# Patient Record
Sex: FEMALE | Race: WHITE | NOT HISPANIC OR LATINO | URBAN - METROPOLITAN AREA
[De-identification: names, ages, dates, MRNs, and addresses within clinical notes are randomized per-mention and may not be internally consistent; named-entity substitution may affect disease eponyms.]

---

## 2017-07-14 ENCOUNTER — APPOINTMENT (RX ONLY)
Dept: URBAN - METROPOLITAN AREA CLINIC 41 | Facility: CLINIC | Age: 82
Setting detail: DERMATOLOGY
End: 2017-07-14

## 2017-07-14 DIAGNOSIS — L0390 CELLULITIS AND ABSCESS OF UNSPECIFIED SITES: ICD-10-CM

## 2017-07-14 DIAGNOSIS — L89 PRESSURE ULCER: ICD-10-CM

## 2017-07-14 DIAGNOSIS — L0391 CELLULITIS AND ABSCESS OF UNSPECIFIED SITES: ICD-10-CM

## 2017-07-14 PROBLEM — L03.116 CELLULITIS OF LEFT LOWER LIMB: Status: ACTIVE | Noted: 2017-07-14

## 2017-07-14 PROBLEM — L89.319 PRESSURE ULCER OF RIGHT BUTTOCK, UNSPECIFIED STAGE: Status: ACTIVE | Noted: 2017-07-14

## 2017-07-14 PROCEDURE — 99212 OFFICE O/P EST SF 10 MIN: CPT | Mod: 25

## 2017-07-14 PROCEDURE — 29580 STRAPPING UNNA BOOT: CPT | Mod: LT

## 2017-07-14 PROCEDURE — ? COUNSELING

## 2017-07-14 PROCEDURE — ? UNNA BOOT APPLICATION

## 2017-07-14 PROCEDURE — ? TREATMENT REGIMEN

## 2017-07-14 ASSESSMENT — LOCATION SIMPLE DESCRIPTION DERM
LOCATION SIMPLE: LEFT PRETIBIAL REGION
LOCATION SIMPLE: RIGHT BUTTOCK

## 2017-07-14 ASSESSMENT — LOCATION DETAILED DESCRIPTION DERM
LOCATION DETAILED: LEFT DISTAL PRETIBIAL REGION
LOCATION DETAILED: RIGHT MEDIAL BUTTOCK

## 2017-07-14 ASSESSMENT — LOCATION ZONE DERM
LOCATION ZONE: LEG
LOCATION ZONE: TRUNK

## 2017-07-14 NOTE — PROCEDURE: UNNA BOOT APPLICATION
Indication: cellulitis
Medication Occluded Under Unnaboot: Bactroban
Additional Instructions: Do not get it wet for one week
Location Applied: the left leg
Detail Level: Detailed

## 2017-07-21 ENCOUNTER — APPOINTMENT (RX ONLY)
Dept: URBAN - METROPOLITAN AREA CLINIC 41 | Facility: CLINIC | Age: 82
Setting detail: DERMATOLOGY
End: 2017-07-21

## 2017-07-21 DIAGNOSIS — L0390 CELLULITIS AND ABSCESS OF UNSPECIFIED SITES: ICD-10-CM

## 2017-07-21 DIAGNOSIS — L0391 CELLULITIS AND ABSCESS OF UNSPECIFIED SITES: ICD-10-CM

## 2017-07-21 PROBLEM — L03.116 CELLULITIS OF LEFT LOWER LIMB: Status: ACTIVE | Noted: 2017-07-21

## 2017-07-21 PROCEDURE — ? UNNA BOOT APPLICATION

## 2017-07-21 PROCEDURE — 29580 STRAPPING UNNA BOOT: CPT | Mod: LT

## 2017-07-21 ASSESSMENT — LOCATION DETAILED DESCRIPTION DERM: LOCATION DETAILED: LEFT DISTAL PRETIBIAL REGION

## 2017-07-21 ASSESSMENT — LOCATION SIMPLE DESCRIPTION DERM: LOCATION SIMPLE: LEFT PRETIBIAL REGION

## 2017-07-21 ASSESSMENT — LOCATION ZONE DERM: LOCATION ZONE: LEG

## 2017-07-21 NOTE — PROCEDURE: UNNA BOOT APPLICATION
Detail Level: Simple
Location Applied: the left leg
Medication Occluded Under Unnaboot: Bactroban
Indication: lymphedema

## 2017-07-28 ENCOUNTER — APPOINTMENT (RX ONLY)
Dept: URBAN - METROPOLITAN AREA CLINIC 41 | Facility: CLINIC | Age: 82
Setting detail: DERMATOLOGY
End: 2017-07-28

## 2017-07-28 DIAGNOSIS — L0391 CELLULITIS AND ABSCESS OF UNSPECIFIED SITES: ICD-10-CM

## 2017-07-28 DIAGNOSIS — L0390 CELLULITIS AND ABSCESS OF UNSPECIFIED SITES: ICD-10-CM

## 2017-07-28 PROBLEM — L03.116 CELLULITIS OF LEFT LOWER LIMB: Status: ACTIVE | Noted: 2017-07-28

## 2017-07-28 PROCEDURE — 29580 STRAPPING UNNA BOOT: CPT | Mod: LT

## 2017-07-28 PROCEDURE — ? UNNA BOOT APPLICATION

## 2017-07-28 ASSESSMENT — LOCATION DETAILED DESCRIPTION DERM: LOCATION DETAILED: LEFT DISTAL PRETIBIAL REGION

## 2017-07-28 ASSESSMENT — LOCATION ZONE DERM: LOCATION ZONE: LEG

## 2017-07-28 ASSESSMENT — LOCATION SIMPLE DESCRIPTION DERM: LOCATION SIMPLE: LEFT PRETIBIAL REGION

## 2017-07-28 NOTE — PROCEDURE: UNNA BOOT APPLICATION
Indication: lymphedema
Medication Occluded Under Unnaboot: Bactroban
Detail Level: Simple
Location Applied: the left leg

## 2017-08-04 ENCOUNTER — APPOINTMENT (RX ONLY)
Dept: URBAN - METROPOLITAN AREA CLINIC 41 | Facility: CLINIC | Age: 82
Setting detail: DERMATOLOGY
End: 2017-08-04

## 2017-08-04 DIAGNOSIS — L0390 CELLULITIS AND ABSCESS OF UNSPECIFIED SITES: ICD-10-CM | Status: IMPROVED

## 2017-08-04 DIAGNOSIS — L0391 CELLULITIS AND ABSCESS OF UNSPECIFIED SITES: ICD-10-CM | Status: IMPROVED

## 2017-08-04 PROBLEM — L03.116 CELLULITIS OF LEFT LOWER LIMB: Status: ACTIVE | Noted: 2017-08-04

## 2017-08-04 PROCEDURE — ? COUNSELING

## 2017-08-04 PROCEDURE — 29580 STRAPPING UNNA BOOT: CPT | Mod: LT

## 2017-08-04 PROCEDURE — ? UNNA BOOT APPLICATION

## 2017-08-04 ASSESSMENT — LOCATION ZONE DERM: LOCATION ZONE: LEG

## 2017-08-04 ASSESSMENT — LOCATION DETAILED DESCRIPTION DERM: LOCATION DETAILED: LEFT DISTAL PRETIBIAL REGION

## 2017-08-04 ASSESSMENT — LOCATION SIMPLE DESCRIPTION DERM: LOCATION SIMPLE: LEFT PRETIBIAL REGION

## 2017-08-11 ENCOUNTER — APPOINTMENT (RX ONLY)
Dept: URBAN - METROPOLITAN AREA CLINIC 41 | Facility: CLINIC | Age: 82
Setting detail: DERMATOLOGY
End: 2017-08-11

## 2017-08-11 DIAGNOSIS — L81.4 OTHER MELANIN HYPERPIGMENTATION: ICD-10-CM

## 2017-08-11 DIAGNOSIS — L0390 CELLULITIS AND ABSCESS OF UNSPECIFIED SITES: ICD-10-CM | Status: IMPROVED

## 2017-08-11 DIAGNOSIS — D18.0 HEMANGIOMA: ICD-10-CM

## 2017-08-11 DIAGNOSIS — L0391 CELLULITIS AND ABSCESS OF UNSPECIFIED SITES: ICD-10-CM | Status: IMPROVED

## 2017-08-11 DIAGNOSIS — L82.1 OTHER SEBORRHEIC KERATOSIS: ICD-10-CM

## 2017-08-11 DIAGNOSIS — D485 NEOPLASM OF UNCERTAIN BEHAVIOR OF SKIN: ICD-10-CM

## 2017-08-11 PROBLEM — D48.5 NEOPLASM OF UNCERTAIN BEHAVIOR OF SKIN: Status: ACTIVE | Noted: 2017-08-11

## 2017-08-11 PROBLEM — L03.116 CELLULITIS OF LEFT LOWER LIMB: Status: ACTIVE | Noted: 2017-08-11

## 2017-08-11 PROBLEM — D18.01 HEMANGIOMA OF SKIN AND SUBCUTANEOUS TISSUE: Status: ACTIVE | Noted: 2017-08-11

## 2017-08-11 PROCEDURE — 29580 STRAPPING UNNA BOOT: CPT | Mod: LT

## 2017-08-11 PROCEDURE — 11100: CPT

## 2017-08-11 PROCEDURE — 99213 OFFICE O/P EST LOW 20 MIN: CPT | Mod: 25

## 2017-08-11 PROCEDURE — ? COUNSELING

## 2017-08-11 PROCEDURE — ? BIOPSY BY SHAVE METHOD

## 2017-08-11 PROCEDURE — ? UNNA BOOT APPLICATION

## 2017-08-11 ASSESSMENT — LOCATION SIMPLE DESCRIPTION DERM
LOCATION SIMPLE: UPPER BACK
LOCATION SIMPLE: RIGHT PRETIBIAL REGION
LOCATION SIMPLE: LEFT PRETIBIAL REGION
LOCATION SIMPLE: RIGHT UPPER ARM
LOCATION SIMPLE: LEFT UPPER ARM

## 2017-08-11 ASSESSMENT — LOCATION ZONE DERM
LOCATION ZONE: ARM
LOCATION ZONE: TRUNK
LOCATION ZONE: LEG

## 2017-08-11 ASSESSMENT — LOCATION DETAILED DESCRIPTION DERM
LOCATION DETAILED: LEFT ANTERIOR DISTAL UPPER ARM
LOCATION DETAILED: INFERIOR THORACIC SPINE
LOCATION DETAILED: RIGHT DISTAL PRETIBIAL REGION
LOCATION DETAILED: RIGHT ANTERIOR PROXIMAL UPPER ARM
LOCATION DETAILED: LEFT DISTAL PRETIBIAL REGION

## 2017-08-11 NOTE — PROCEDURE: UNNA BOOT APPLICATION
Detail Level: Zone
Location Applied: the left leg
Indication: post-operative immobilization
Medication Occluded Under Unnaboot: Antibiotic ointment
Additional Instructions: Do not get it wet for one week

## 2017-08-11 NOTE — PROCEDURE: BIOPSY BY SHAVE METHOD
Additional Anesthesia Volume In Cc (Will Not Render If 0): 0
X Size Of Lesion In Cm: 0.4
Biopsy Method: Double edge Personna blades
Biopsy Type: H and E
Consent: Verbal consent was obtained and risks were reviewed including but not limited to scarring, infection, bleeding, scabbing, incomplete removal, nerve damage and allergy to anesthesia.
Wound Care: Mupirocin
Anesthesia Volume In Cc: 0.5
Notification Instructions: Patient will be notified of biopsy results. However, patient instructed to call the office if not contacted within 2 weeks.
Lab: [id number]
Detail Level: Detailed
Lab Facility: [id number]
Silver Nitrate Text: The wound bed was treated with silver nitrate after the biopsy was performed.
Electrodesiccation Text: The wound bed was treated with electrodesiccation after the biopsy was performed.
Electrodesiccation And Curettage Text: The wound bed was treated with electrodesiccation and curettage after the biopsy was performed.
Render Post-Care Instructions In Note?: no
Cryotherapy Text: The wound bed was treated with cryotherapy after the biopsy was performed.
Anesthesia Type: 1% lidocaine without epinephrine
Dressing: bandage
Billing Type: Third-Party Bill
Type Of Destruction Used: Electrodesiccation and Curettage
Hemostasis: Electrocautery
Post-Care Instructions: I reviewed with the patient in detail post-care instructions. Patient is to keep the biopsy site dry overnight, and then apply bacitracin twice daily until healed. Patient may apply hydrogen peroxide soaks to remove any crusting.

## 2017-08-18 ENCOUNTER — APPOINTMENT (RX ONLY)
Dept: URBAN - METROPOLITAN AREA CLINIC 41 | Facility: CLINIC | Age: 82
Setting detail: DERMATOLOGY
End: 2017-08-18

## 2017-08-18 DIAGNOSIS — L97 NON-PRESSURE CHRONIC ULCER OF LOWER LIMB, NOT ELSEWHERE CLASSIFIED: ICD-10-CM

## 2017-08-18 PROBLEM — L97.829 NON-PRESSURE CHRONIC ULCER OF OTHER PART OF LEFT LOWER LEG WITH UNSPECIFIED SEVERITY: Status: ACTIVE | Noted: 2017-08-18

## 2017-08-18 PROCEDURE — ? TREATMENT REGIMEN

## 2017-08-18 PROCEDURE — ? OTHER

## 2017-08-18 PROCEDURE — ? COUNSELING

## 2017-08-18 PROCEDURE — 99212 OFFICE O/P EST SF 10 MIN: CPT

## 2017-08-18 ASSESSMENT — LOCATION DETAILED DESCRIPTION DERM: LOCATION DETAILED: LEFT MEDIAL DISTAL PRETIBIAL REGION

## 2017-08-18 ASSESSMENT — LOCATION SIMPLE DESCRIPTION DERM: LOCATION SIMPLE: LEFT PRETIBIAL REGION

## 2017-08-18 ASSESSMENT — LOCATION ZONE DERM: LOCATION ZONE: LEG

## 2017-08-30 NOTE — PROCEDURE: OTHER
Detail Level: Zone
Note Text (......Xxx Chief Complaint.): This diagnosis correlates with the
Other (Free Text): Had Jazmin step in to look at the leg
Unknown if ever smoked

## 2017-09-01 ENCOUNTER — APPOINTMENT (RX ONLY)
Dept: URBAN - METROPOLITAN AREA CLINIC 41 | Facility: CLINIC | Age: 82
Setting detail: DERMATOLOGY
End: 2017-09-01

## 2017-09-01 DIAGNOSIS — L97 NON-PRESSURE CHRONIC ULCER OF LOWER LIMB, NOT ELSEWHERE CLASSIFIED: ICD-10-CM

## 2017-09-01 PROBLEM — L97.829 NON-PRESSURE CHRONIC ULCER OF OTHER PART OF LEFT LOWER LEG WITH UNSPECIFIED SEVERITY: Status: ACTIVE | Noted: 2017-09-01

## 2017-09-01 PROCEDURE — 29580 STRAPPING UNNA BOOT: CPT | Mod: LT

## 2017-09-01 PROCEDURE — ? UNNA BOOT APPLICATION

## 2017-09-01 PROCEDURE — ? COUNSELING

## 2017-09-01 ASSESSMENT — LOCATION SIMPLE DESCRIPTION DERM: LOCATION SIMPLE: LEFT PRETIBIAL REGION

## 2017-09-01 ASSESSMENT — LOCATION ZONE DERM: LOCATION ZONE: LEG

## 2017-09-01 ASSESSMENT — LOCATION DETAILED DESCRIPTION DERM: LOCATION DETAILED: LEFT DISTAL PRETIBIAL REGION

## 2017-09-01 NOTE — PROCEDURE: UNNA BOOT APPLICATION
Medication Occluded Under Unnaboot: Topicort
Indication: post-operative immobilization
Location Applied: the left leg
Additional Instructions: Do not get it wet for one week
Detail Level: Detailed

## 2017-09-08 ENCOUNTER — APPOINTMENT (RX ONLY)
Dept: URBAN - METROPOLITAN AREA CLINIC 41 | Facility: CLINIC | Age: 82
Setting detail: DERMATOLOGY
End: 2017-09-08

## 2017-09-08 DIAGNOSIS — L97 NON-PRESSURE CHRONIC ULCER OF LOWER LIMB, NOT ELSEWHERE CLASSIFIED: ICD-10-CM

## 2017-09-08 PROBLEM — L97.329 NON-PRESSURE CHRONIC ULCER OF LEFT ANKLE WITH UNSPECIFIED SEVERITY: Status: ACTIVE | Noted: 2017-09-08

## 2017-09-08 PROCEDURE — ? UNNA BOOT APPLICATION

## 2017-09-08 PROCEDURE — 29580 STRAPPING UNNA BOOT: CPT | Mod: LT

## 2017-09-08 ASSESSMENT — LOCATION ZONE DERM: LOCATION ZONE: LEG

## 2017-09-08 ASSESSMENT — LOCATION DETAILED DESCRIPTION DERM: LOCATION DETAILED: LEFT ANTERIOR MEDIAL MALLEOLUS

## 2017-09-08 ASSESSMENT — LOCATION SIMPLE DESCRIPTION DERM: LOCATION SIMPLE: LEFT ANKLE

## 2017-09-08 NOTE — PROCEDURE: UNNA BOOT APPLICATION
Detail Level: Simple
Location Applied: the left leg
Indication: chronic ulcer
Medication Occluded Under Unnaboot: Bactroban

## 2017-09-15 ENCOUNTER — APPOINTMENT (RX ONLY)
Dept: URBAN - METROPOLITAN AREA CLINIC 41 | Facility: CLINIC | Age: 82
Setting detail: DERMATOLOGY
End: 2017-09-15

## 2017-09-15 DIAGNOSIS — L97 NON-PRESSURE CHRONIC ULCER OF LOWER LIMB, NOT ELSEWHERE CLASSIFIED: ICD-10-CM | Status: IMPROVED

## 2017-09-15 PROBLEM — L97.829 NON-PRESSURE CHRONIC ULCER OF OTHER PART OF LEFT LOWER LEG WITH UNSPECIFIED SEVERITY: Status: ACTIVE | Noted: 2017-09-15

## 2017-09-15 PROCEDURE — 29580 STRAPPING UNNA BOOT: CPT | Mod: LT

## 2017-09-15 PROCEDURE — ? COUNSELING

## 2017-09-15 PROCEDURE — ? UNNA BOOT APPLICATION

## 2017-09-15 ASSESSMENT — LOCATION DETAILED DESCRIPTION DERM: LOCATION DETAILED: LEFT DISTAL PRETIBIAL REGION

## 2017-09-15 ASSESSMENT — LOCATION ZONE DERM: LOCATION ZONE: LEG

## 2017-09-15 ASSESSMENT — LOCATION SIMPLE DESCRIPTION DERM: LOCATION SIMPLE: LEFT PRETIBIAL REGION

## 2017-09-15 NOTE — PROCEDURE: UNNA BOOT APPLICATION
Other Medication Occluded Under Unnaboot: timolol
Detail Level: Simple
Indication: stasis ulcer
Location Applied: the left leg

## 2017-09-22 ENCOUNTER — APPOINTMENT (RX ONLY)
Dept: URBAN - METROPOLITAN AREA CLINIC 41 | Facility: CLINIC | Age: 82
Setting detail: DERMATOLOGY
End: 2017-09-22

## 2017-09-22 DIAGNOSIS — L97 NON-PRESSURE CHRONIC ULCER OF LOWER LIMB, NOT ELSEWHERE CLASSIFIED: ICD-10-CM | Status: RESOLVING

## 2017-09-22 PROBLEM — L97.829 NON-PRESSURE CHRONIC ULCER OF OTHER PART OF LEFT LOWER LEG WITH UNSPECIFIED SEVERITY: Status: ACTIVE | Noted: 2017-09-22

## 2017-09-22 PROCEDURE — ? UNNA BOOT APPLICATION

## 2017-09-22 PROCEDURE — 29580 STRAPPING UNNA BOOT: CPT | Mod: LT

## 2017-09-22 ASSESSMENT — LOCATION SIMPLE DESCRIPTION DERM: LOCATION SIMPLE: LEFT PRETIBIAL REGION

## 2017-09-22 ASSESSMENT — LOCATION DETAILED DESCRIPTION DERM: LOCATION DETAILED: LEFT DISTAL PRETIBIAL REGION

## 2017-09-22 ASSESSMENT — LOCATION ZONE DERM: LOCATION ZONE: LEG

## 2017-09-29 ENCOUNTER — APPOINTMENT (RX ONLY)
Dept: URBAN - METROPOLITAN AREA CLINIC 41 | Facility: CLINIC | Age: 82
Setting detail: DERMATOLOGY
End: 2017-09-29

## 2017-09-29 DIAGNOSIS — I83009 VARICOSE VEINS OF LOWER EXTREMITIES WITH ULCER: ICD-10-CM | Status: RESOLVED

## 2017-09-29 DIAGNOSIS — I83029 VARICOSE VEINS OF LOWER EXTREMITIES WITH ULCER: ICD-10-CM | Status: RESOLVED

## 2017-09-29 DIAGNOSIS — I83019 VARICOSE VEINS OF LOWER EXTREMITIES WITH ULCER: ICD-10-CM | Status: RESOLVED

## 2017-09-29 PROBLEM — I83.028 VARICOSE VEINS OF LEFT LOWER EXTREMITY WITH ULCER OTHER PART OF LOWER LEG: Status: ACTIVE | Noted: 2017-09-29

## 2017-09-29 PROCEDURE — ? OBSERVATION

## 2017-09-29 PROCEDURE — ? COUNSELING

## 2017-09-29 PROCEDURE — 99212 OFFICE O/P EST SF 10 MIN: CPT

## 2017-09-29 ASSESSMENT — LOCATION ZONE DERM: LOCATION ZONE: LEG

## 2017-09-29 ASSESSMENT — LOCATION SIMPLE DESCRIPTION DERM: LOCATION SIMPLE: LEFT PRETIBIAL REGION

## 2017-09-29 ASSESSMENT — LOCATION DETAILED DESCRIPTION DERM: LOCATION DETAILED: LEFT DISTAL PRETIBIAL REGION

## 2021-09-14 ENCOUNTER — APPOINTMENT (RX ONLY)
Dept: URBAN - METROPOLITAN AREA CLINIC 41 | Facility: CLINIC | Age: 86
Setting detail: DERMATOLOGY
End: 2021-09-14

## 2021-09-14 DIAGNOSIS — D485 NEOPLASM OF UNCERTAIN BEHAVIOR OF SKIN: ICD-10-CM

## 2021-09-14 PROBLEM — D48.5 NEOPLASM OF UNCERTAIN BEHAVIOR OF SKIN: Status: ACTIVE | Noted: 2021-09-14

## 2021-09-14 PROCEDURE — 29580 STRAPPING UNNA BOOT: CPT | Mod: RT

## 2021-09-14 PROCEDURE — ? BIOPSY BY SHAVE METHOD

## 2021-09-14 PROCEDURE — ? UNNA BOOT APPLICATION

## 2021-09-14 PROCEDURE — A6453 SELF-ADHER BAND W <3"/YD: HCPCS

## 2021-09-14 PROCEDURE — A6443 CONFORM BAND N/S W>=3"<5"/YD: HCPCS

## 2021-09-14 PROCEDURE — 11102 TANGNTL BX SKIN SINGLE LES: CPT

## 2021-09-14 PROCEDURE — ? SEPARATE AND IDENTIFIABLE DOCUMENTATION

## 2021-09-14 PROCEDURE — 99202 OFFICE O/P NEW SF 15 MIN: CPT | Mod: 25

## 2021-09-14 PROCEDURE — A6456 ZINC PASTE BAND W >=3"<5"/YD: HCPCS

## 2021-09-14 PROCEDURE — ? PHOTO-DOCUMENTATION

## 2021-09-14 ASSESSMENT — LOCATION DETAILED DESCRIPTION DERM
LOCATION DETAILED: RIGHT MEDIAL DISTAL PRETIBIAL REGION
LOCATION DETAILED: RIGHT DISTAL PRETIBIAL REGION

## 2021-09-14 ASSESSMENT — LOCATION SIMPLE DESCRIPTION DERM: LOCATION SIMPLE: RIGHT PRETIBIAL REGION

## 2021-09-14 ASSESSMENT — LOCATION ZONE DERM: LOCATION ZONE: LEG

## 2021-09-14 NOTE — PROCEDURE: UNNA BOOT APPLICATION
After Unna Boot Application Text: Coban was used to wrap the outside of Unna Boot and we ensured the Unna Boot wasn't too tight prior to the patient leaving the office.
Removal Of Previous Unna Boot (Yes) Text: The previous Unna Boot and then the site was cleaned in preparation for another Unna Boot application.
Was A Previous Unna Boot Removed?: No
Location Applied: the right leg
Coban Units: 1
Additional Instructions: Do not get it wet for one week
Bill For Unna Boot Supplies?: Yes
Indication: stasis ulcer
Detail Level: Detailed
Removal Of Previous Unna Boot (No) Text: The site was cleaned in preparation for an Unna Boot application.

## 2021-09-14 NOTE — PROCEDURE: BIOPSY BY SHAVE METHOD
Detail Level: Detailed
Depth Of Biopsy: dermis
Was A Bandage Applied: Yes
Size Of Lesion In Cm: 0
Biopsy Type: H and E
Biopsy Method: double edge Personna blade
Anesthesia Type: 1% lidocaine without epinephrine
Anesthesia Volume In Cc: 1
Hemostasis: Electrocautery
Wound Care: Vaseline
Dressing: Band-Aid
Destruction After The Procedure: No
Type Of Destruction Used: Electrodesiccation and Curettage
Cryotherapy Text: The wound bed was treated with cryotherapy after the biopsy was performed.
Electrodesiccation Text: The wound bed was treated with electrodesiccation after the biopsy was performed.
Electrodesiccation And Curettage Text: The wound bed was treated with electrodesiccation and curettage after the biopsy was performed.
Silver Nitrate Text: The wound bed was treated with silver nitrate after the biopsy was performed.
Lab: [id number]
Lab Facility: [id number]
Consent: Verbal consent was obtained and risks were reviewed including but not limited to scarring, infection, bleeding, scabbing, incomplete removal, nerve damage and allergy to anesthesia.
Post-Care Instructions: I reviewed with the patient in detail post-care instructions. Patient is to keep the biopsy site dry overnight, and then apply vaseline twice daily until healed.
Notification Instructions: Patient will be notified of biopsy results. However, patient instructed to call the office if not contacted within 2 weeks.
Billing Type: Third-Party Bill
Information: Selecting Yes will display possible errors in your note based on the variables you have selected. This validation is only offered as a suggestion for you. PLEASE NOTE THAT THE VALIDATION TEXT WILL BE REMOVED WHEN YOU FINALIZE YOUR NOTE. IF YOU WANT TO FAX A PRELIMINARY NOTE YOU WILL NEED TO TOGGLE THIS TO 'NO' IF YOU DO NOT WANT IT IN YOUR FAXED NOTE.

## 2021-09-21 ENCOUNTER — APPOINTMENT (RX ONLY)
Dept: URBAN - METROPOLITAN AREA CLINIC 41 | Facility: CLINIC | Age: 86
Setting detail: DERMATOLOGY
End: 2021-09-21

## 2021-09-21 DIAGNOSIS — Z48.817 ENCOUNTER FOR SURGICAL AFTERCARE FOLLOWING SURGERY ON THE SKIN AND SUBCUTANEOUS TISSUE: ICD-10-CM

## 2021-09-21 PROCEDURE — 99024 POSTOP FOLLOW-UP VISIT: CPT

## 2021-09-21 PROCEDURE — ? TREATMENT REGIMEN

## 2021-09-21 ASSESSMENT — LOCATION DETAILED DESCRIPTION DERM: LOCATION DETAILED: RIGHT DISTAL PRETIBIAL REGION

## 2021-09-21 ASSESSMENT — LOCATION ZONE DERM: LOCATION ZONE: LEG

## 2021-09-21 ASSESSMENT — LOCATION SIMPLE DESCRIPTION DERM: LOCATION SIMPLE: RIGHT PRETIBIAL REGION

## 2021-09-21 NOTE — PROCEDURE: TREATMENT REGIMEN
Detail Level: Simple
Plan: Marcos sharpe removed. Jazmin stepped in and discussed results.  Vaseline, telfa and Coban applied